# Patient Record
Sex: FEMALE
[De-identification: names, ages, dates, MRNs, and addresses within clinical notes are randomized per-mention and may not be internally consistent; named-entity substitution may affect disease eponyms.]

---

## 2024-05-29 ENCOUNTER — NON-APPOINTMENT (OUTPATIENT)
Age: 44
End: 2024-05-29

## 2024-05-29 ENCOUNTER — APPOINTMENT (OUTPATIENT)
Dept: ORTHOPEDIC SURGERY | Facility: CLINIC | Age: 44
End: 2024-05-29
Payer: COMMERCIAL

## 2024-05-29 VITALS — WEIGHT: 140 LBS | BODY MASS INDEX: 26.43 KG/M2 | HEIGHT: 61 IN

## 2024-05-29 DIAGNOSIS — M25.811 OTHER SPECIFIED JOINT DISORDERS, RIGHT SHOULDER: ICD-10-CM

## 2024-05-29 PROBLEM — Z00.00 ENCOUNTER FOR PREVENTIVE HEALTH EXAMINATION: Status: ACTIVE | Noted: 2024-05-29

## 2024-05-29 PROCEDURE — 99203 OFFICE O/P NEW LOW 30 MIN: CPT

## 2024-05-29 NOTE — HISTORY OF PRESENT ILLNESS
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 She first felt this when she was helping to move a patient and felt a popping sensation in her right shoulder. Then two weeks ago she was lifting aa heavy tray at a party and again had severe pain in her posterior lateral shoulder and trapezius. She saw a primary care doctor who prescribed Sulindac 200mg which has been helping. That doctor also recommended physical therapy which she has not started yet.

## 2024-05-29 NOTE — DISCUSSION/SUMMARY
[de-identified] : Discussed findings of today's exam and possible causes of patient's pain.  Educated patient on their most probable diagnosis of right shoulder impingement.  Reviewed possible courses of treatment, and we collaboratively decided best course of treatment at this time will include conservative management.  Patient was prescribed NSAIDs by her PCP, recommend taking this medication consistently for 1-2 weeks, then as needed thereafter.  Patient will be started on a course of physical therapy to restore normal range of motion and strength as tolerated.  Patient has no reported injury or trauma, no evidence of any high-grade rotator cuff tear or rupture, no surgical indications, no need for MRI at this time.  If patient has persisting pain may consider cortisone injection at that time.  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  Follow up as needed.  Patient appreciates and agrees with current plan.  This note was generated using dragon medical dictation software.  A reasonable effort has been made for proofreading its contents, but typos may still remain.  If there are any questions or points of clarification needed please notify my office.

## 2024-05-29 NOTE — PHYSICAL EXAM
[de-identified] : Constitutional: Well-nourished, well-developed, No acute distress Respiratory:  Good respiratory effort, no SOB Psychiatric: Pleasant and normal affect, alert and oriented x3 Skin: Clean dry and intact B/L UE Musculoskeletal: normal except where as noted in regional exam   Right Shoulder: APPEARANCE: no marked deformities, no swelling or malalignment POSITIVE TENDERNESS: supraspinatus NONTENDER:  long head biceps tendon, infraspinatus, teres minor. biceps. anterior and posterior capsule. AC joint. ROM: full with mild painful arc past 60 degrees, no scapular winging or dyskinesia present RESISTIVE TESTING: MMT 4+/5 ER, Flexion and Empty can, 5/5 IR. painless 5/5 resisted ext, horizontal abd/add SPECIAL TESTS: + Baker and Neers, mildly + cross arm adduction, + Speeds, neg Toth's, neg Drop Arm, neg Apprehension. neg apley's scratch test